# Patient Record
Sex: FEMALE | Race: WHITE | NOT HISPANIC OR LATINO | URBAN - METROPOLITAN AREA
[De-identification: names, ages, dates, MRNs, and addresses within clinical notes are randomized per-mention and may not be internally consistent; named-entity substitution may affect disease eponyms.]

---

## 2017-03-14 ENCOUNTER — IMPORTED ENCOUNTER (OUTPATIENT)
Dept: URBAN - METROPOLITAN AREA CLINIC 50 | Facility: CLINIC | Age: 72
End: 2017-03-14

## 2017-03-17 ENCOUNTER — IMPORTED ENCOUNTER (OUTPATIENT)
Dept: URBAN - METROPOLITAN AREA CLINIC 50 | Facility: CLINIC | Age: 72
End: 2017-03-17

## 2017-03-30 ENCOUNTER — IMPORTED ENCOUNTER (OUTPATIENT)
Dept: URBAN - METROPOLITAN AREA CLINIC 50 | Facility: CLINIC | Age: 72
End: 2017-03-30

## 2018-09-20 ENCOUNTER — IMPORTED ENCOUNTER (OUTPATIENT)
Dept: URBAN - METROPOLITAN AREA CLINIC 50 | Facility: CLINIC | Age: 73
End: 2018-09-20

## 2018-10-15 ENCOUNTER — IMPORTED ENCOUNTER (OUTPATIENT)
Dept: URBAN - METROPOLITAN AREA CLINIC 50 | Facility: CLINIC | Age: 73
End: 2018-10-15

## 2019-09-19 ENCOUNTER — IMPORTED ENCOUNTER (OUTPATIENT)
Dept: URBAN - METROPOLITAN AREA CLINIC 50 | Facility: CLINIC | Age: 74
End: 2019-09-19

## 2019-11-04 ENCOUNTER — IMPORTED ENCOUNTER (OUTPATIENT)
Dept: URBAN - METROPOLITAN AREA CLINIC 50 | Facility: CLINIC | Age: 74
End: 2019-11-04

## 2019-11-07 ENCOUNTER — IMPORTED ENCOUNTER (OUTPATIENT)
Dept: URBAN - METROPOLITAN AREA CLINIC 50 | Facility: CLINIC | Age: 74
End: 2019-11-07

## 2020-10-14 NOTE — PATIENT DISCUSSION
Laser recommended OD to improve visual function. Discussed the risks/benefits of laser capsulotomy, including risk of RD/RT, floaters, lens dislocation. All questions answered and handout given today. Patient elects to proceed. Schedule Yag Cap OD and 2 week PO.

## 2020-10-22 ENCOUNTER — IMPORTED ENCOUNTER (OUTPATIENT)
Dept: URBAN - METROPOLITAN AREA CLINIC 50 | Facility: CLINIC | Age: 75
End: 2020-10-22

## 2020-10-26 ENCOUNTER — IMPORTED ENCOUNTER (OUTPATIENT)
Dept: URBAN - METROPOLITAN AREA CLINIC 50 | Facility: CLINIC | Age: 75
End: 2020-10-26

## 2020-11-02 NOTE — PATIENT DISCUSSION
Continue following with Dr. Belinda Hummel as schedule or can RTC here for Exam in 1 year, sooner if problems or changes.

## 2021-04-18 ASSESSMENT — PACHYMETRY
OS_CT_UM: 501
OD_CT_UM: 499
OS_CT_UM: 501
OD_CT_UM: 499
OD_CT_UM: 499
OS_CT_UM: 501
OS_CT_UM: 501
OD_CT_UM: 499
OS_CT_UM: 501
OS_CT_UM: 501
OD_CT_UM: 499
OD_CT_UM: 499

## 2021-04-18 ASSESSMENT — TONOMETRY
OD_IOP_MMHG: 11
OD_IOP_MMHG: 12
OS_IOP_MMHG: 10
OD_IOP_MMHG: 12
OD_IOP_MMHG: 10
OS_IOP_MMHG: 12
OD_IOP_MMHG: 09
OS_IOP_MMHG: 12
OD_IOP_MMHG: 10
OS_IOP_MMHG: 14
OD_IOP_MMHG: 12
OS_IOP_MMHG: 12
OS_IOP_MMHG: 11
OD_IOP_MMHG: 11
OS_IOP_MMHG: 09
OS_IOP_MMHG: 11

## 2021-04-18 ASSESSMENT — VISUAL ACUITY
OD_BAT: 20/40
OS_OTHER: 20/50. 20/70.
OD_OTHER: 20/30. 20/30.
OS_CC: 20/30-
OS_CC: 20/25
OD_CC: J1+
OS_CC: J1+
OD_BAT: 20/30
OS_OTHER: 20/40. 20/50.
OD_CC: 20/20
OS_CC: J1+
OD_CC: 20/20-
OD_CC: 20/25-1
OS_OTHER: 20/40. 20/60.
OD_OTHER: 20/30. 20/40.
OS_CC: 20/25+2
OD_CC: J1+
OS_CC: 20/20
OD_BAT: 20/30
OS_CC: 20/25+2
OD_CC: 20/25+2
OS_OTHER: 20/25.
OS_CC: J1+
OD_OTHER: 20/30. 20/60.
OD_CC: 20/25-1
OS_BAT: 20/50
OD_OTHER: 20/40. 20/70.
OS_BAT: 20/40
OD_CC: J1+
OS_BAT: 20/40
OD_BAT: 20/30

## 2021-06-22 NOTE — PATIENT DISCUSSION
Can schedule Yag OS if Pt calls and has become more bothered after proceeds with Yag OD.
How Severe Is It?: moderate
Laser recommended OD to improve visual function. Discussed the risks/benefits of laser capsulotomy, including risk of RD/RT, floaters, lens dislocation. All questions answered and handout given today. Patient elects to proceed. Schedule Yag Cap OD and 2 week PO.
Monitor.
Monitor. Eat healthy and wear sunglasses. Call with any vision changes or distortion.
Pt bothered and symptomatic.  Pt referred by DINORA for Yag Eval.
Recommended artificial tears to use as directed.
Will plan Exam in 1 year, sooner if problems or changes.
Is This A New Presentation, Or A Follow-Up?: Rash
Additional History: Pt has been using Selsum Blue

## 2022-02-11 ENCOUNTER — PREPPED CHART (OUTPATIENT)
Dept: URBAN - METROPOLITAN AREA CLINIC 52 | Facility: CLINIC | Age: 77
End: 2022-02-11

## 2022-02-17 ENCOUNTER — COMPREHENSIVE EXAM (OUTPATIENT)
Dept: URBAN - METROPOLITAN AREA CLINIC 52 | Facility: CLINIC | Age: 77
End: 2022-02-17

## 2022-02-17 DIAGNOSIS — H25.13: ICD-10-CM

## 2022-02-17 DIAGNOSIS — H40.013: ICD-10-CM

## 2022-02-17 DIAGNOSIS — H43.813: ICD-10-CM

## 2022-02-17 PROCEDURE — 92014 COMPRE OPH EXAM EST PT 1/>: CPT

## 2022-02-17 ASSESSMENT — TONOMETRY
OD_IOP_MMHG: 10
OS_IOP_MMHG: 12
OD_IOP_MMHG: 12
OS_IOP_MMHG: 10

## 2022-02-17 ASSESSMENT — VISUAL ACUITY
OU_CC: J1+@16INCHES
OD_GLARE: 20/25
OS_GLARE: 20/30
OD_GLARE: 20/40
OS_CC: 20/20 PUSH
OD_CC: J1+@16INCHES
OS_GLARE: 20/25
OD_CC: 20/20 PUSH
OS_CC: J1+@16INCHES

## 2022-04-07 ENCOUNTER — DIAGNOSTICS ONLY (OUTPATIENT)
Dept: URBAN - METROPOLITAN AREA CLINIC 48 | Facility: CLINIC | Age: 77
End: 2022-04-07

## 2022-04-07 DIAGNOSIS — H40.013: ICD-10-CM

## 2022-04-07 PROCEDURE — 92083 EXTENDED VISUAL FIELD XM: CPT

## 2022-04-07 PROCEDURE — 92133 CPTRZD OPH DX IMG PST SGM ON: CPT

## 2022-04-21 ENCOUNTER — CONTACT LENSES/GLASSES VISIT (OUTPATIENT)
Dept: URBAN - METROPOLITAN AREA CLINIC 53 | Facility: CLINIC | Age: 77
End: 2022-04-21

## 2022-04-21 DIAGNOSIS — H53.8: ICD-10-CM

## 2022-04-21 PROCEDURE — 92015GRNC REFRACTION GLASSES RECHECK NO CHARGE

## 2024-03-07 ENCOUNTER — COMPREHENSIVE EXAM (OUTPATIENT)
Dept: URBAN - METROPOLITAN AREA CLINIC 52 | Facility: CLINIC | Age: 79
End: 2024-03-07

## 2024-03-07 DIAGNOSIS — H52.203: ICD-10-CM

## 2024-03-07 DIAGNOSIS — H25.13: ICD-10-CM

## 2024-03-07 DIAGNOSIS — H43.813: ICD-10-CM

## 2024-03-07 DIAGNOSIS — H40.013: ICD-10-CM

## 2024-03-07 PROCEDURE — 92015 DETERMINE REFRACTIVE STATE: CPT

## 2024-03-07 PROCEDURE — 92014 COMPRE OPH EXAM EST PT 1/>: CPT

## 2024-03-07 ASSESSMENT — TONOMETRY
OS_IOP_MMHG: 12
OD_IOP_MMHG: 12
OD_IOP_MMHG: 10
OS_IOP_MMHG: 10

## 2024-03-07 ASSESSMENT — VISUAL ACUITY
OD_CC: 20/20-3
OU_CC: 20/20
OS_GLARE: 20/20
OU_CC: J1
OD_GLARE: 20/20
OS_GLARE: 20/20
OS_CC: 20/20-1
OD_GLARE: 20/20

## 2024-06-06 ENCOUNTER — DIAGNOSTICS ONLY (OUTPATIENT)
Dept: URBAN - METROPOLITAN AREA CLINIC 52 | Facility: CLINIC | Age: 79
End: 2024-06-06

## 2024-06-06 DIAGNOSIS — H40.013: ICD-10-CM

## 2024-06-06 PROCEDURE — 92133 CPTRZD OPH DX IMG PST SGM ON: CPT

## 2024-06-06 PROCEDURE — 92083 EXTENDED VISUAL FIELD XM: CPT

## 2025-03-20 ENCOUNTER — COMPREHENSIVE EXAM (OUTPATIENT)
Age: 80
End: 2025-03-20

## 2025-03-20 DIAGNOSIS — H40.013: ICD-10-CM

## 2025-03-20 DIAGNOSIS — H43.813: ICD-10-CM

## 2025-03-20 DIAGNOSIS — H25.13: ICD-10-CM

## 2025-03-20 DIAGNOSIS — H52.203: ICD-10-CM

## 2025-03-20 DIAGNOSIS — H18.522: ICD-10-CM

## 2025-03-20 PROCEDURE — 92015 DETERMINE REFRACTIVE STATE: CPT

## 2025-03-20 PROCEDURE — 99214 OFFICE O/P EST MOD 30 MIN: CPT
